# Patient Record
Sex: FEMALE | URBAN - METROPOLITAN AREA
[De-identification: names, ages, dates, MRNs, and addresses within clinical notes are randomized per-mention and may not be internally consistent; named-entity substitution may affect disease eponyms.]

---

## 2019-02-20 ENCOUNTER — TELEPHONE (OUTPATIENT)
Dept: TRANSPLANT | Facility: CLINIC | Age: 69
End: 2019-02-20

## 2019-02-20 NOTE — TELEPHONE ENCOUNTER
Bhaktimalathi Flores called and stated that she is interested in becoming a living donor for her friend, Nat Jackson.  Medical and social history obtained.  Patient reports that her blood type is A, which is not compatible with the recipient (O). Patient verbalized understanding.